# Patient Record
Sex: FEMALE | Race: WHITE | URBAN - METROPOLITAN AREA
[De-identification: names, ages, dates, MRNs, and addresses within clinical notes are randomized per-mention and may not be internally consistent; named-entity substitution may affect disease eponyms.]

---

## 2023-05-11 LAB
EST. AVERAGE GLUCOSE BLD GHB EST-MCNC: 120 MG/DL
HBA1C MFR BLD: 5.8 %

## 2023-08-11 LAB
CHOLESTEROL/HDL RATIO: 3.2
CHOLESTEROL: 181 MG/DL
HDLC SERPL-MCNC: 56 MG/DL
HOMOCYSTINE BLOOD: 9.81 UMOL/L
LDL CHOLESTEROL CALCULATED: 108 MG/DL
MAGNESIUM: 2 MG/DL
TRIGL SERPL-MCNC: 87 MG/DL
VITAMIN B-12: 988 PG/ML
VITAMIN D 25-HYDROXY: 148.9 NG/ML
VLDLC SERPL CALC-MCNC: 17 MG/DL

## 2025-02-20 ENCOUNTER — HOSPITAL ENCOUNTER (OUTPATIENT)
Age: 65
Setting detail: SPECIMEN
Discharge: HOME | End: 2025-02-20

## 2025-02-20 LAB
ALP SERPL-CCNC: 135 U/L (ref 35–104)
EST. AVERAGE GLUCOSE BLD GHB EST-MCNC: 94 MG/DL
HBA1C MFR BLD: 4.9 % (ref 4–6)

## 2025-02-22 LAB
ALK PHOS BONE SPECIFIC: 81 U/L (ref 0–55)
ALK PHOS OTHER CALC: 0 U/L
ALK PHOSPHATASE: 142 U/L (ref 40–120)
ALKALINE PHOSPHATASE LIVER FRACTION: 61 U/L (ref 0–94)

## 2025-05-01 ENCOUNTER — HOSPITAL ENCOUNTER (OUTPATIENT)
Age: 65
Setting detail: SPECIMEN
Discharge: HOME | End: 2025-05-01

## 2025-05-01 LAB
CREAT SERPL-MCNC: 0.6 MG/DL (ref 0.6–0.9)
GFR, ESTIMATED: >90 ML/MIN/1.73M2

## 2025-05-27 ENCOUNTER — CLINICAL DOCUMENTATION (OUTPATIENT)
Dept: SURGERY | Age: 65
End: 2025-05-27

## 2025-05-27 NOTE — PROGRESS NOTES
Patient called left message requesting appointment regarding possible need for surgical intervention for hernia, states she has seen MD in clinic 10years ago. This nurse attempted to reach patient - left message.

## 2025-06-02 ENCOUNTER — CLINICAL DOCUMENTATION (OUTPATIENT)
Dept: SURGERY | Age: 65
End: 2025-06-02

## 2025-06-23 ENCOUNTER — HOSPITAL ENCOUNTER (OUTPATIENT)
Dept: SURGERY | Age: 65
Discharge: HOME | End: 2025-06-23
Payer: COMMERCIAL

## 2025-06-23 VITALS
HEART RATE: 62 BPM | WEIGHT: 179.9 LBS | OXYGEN SATURATION: 94 % | DIASTOLIC BLOOD PRESSURE: 52 MMHG | RESPIRATION RATE: 18 BRPM | SYSTOLIC BLOOD PRESSURE: 94 MMHG | BODY MASS INDEX: 28.91 KG/M2 | HEIGHT: 66 IN

## 2025-06-23 PROBLEM — R19.00 ABDOMINAL WALL BULGE: Status: ACTIVE | Noted: 2025-06-23

## 2025-06-23 PROCEDURE — 99202 OFFICE O/P NEW SF 15 MIN: CPT | Performed by: STUDENT IN AN ORGANIZED HEALTH CARE EDUCATION/TRAINING PROGRAM

## 2025-06-23 PROCEDURE — 99203 OFFICE O/P NEW LOW 30 MIN: CPT | Performed by: STUDENT IN AN ORGANIZED HEALTH CARE EDUCATION/TRAINING PROGRAM

## 2025-06-23 NOTE — H&P
[facility]  Consult Note            NAME:  Yusef Ramirez  MRN: [id number]   YOB: 1960   Date: 6/23/2025   Age: 64 y.o.  Gender: female     Body mass index is 29.04 kg/m².     Chief Complaint: Abdominal lump    History of Present Illness: Patient is a 64-year-old female with a history of hypothyroidism and prediabetes who presents with right abdominal lump.  Patient states that over the last 18 months she has lost over 100 pounds with diet and exercise.  Since she has lost so much weight, she noticed that there is a slight bulge to the right abdominal wall.  She denies any pain at the area.  She thought that maybe since she was doing so many ab workouts that this could have caused something.  The lump has not gotten any larger.  She otherwise denies any sort of abdominal pain or discomfort.  States that she feels and sees it more when she is standing up and it goes away as soon as she lays down.  Patient was evaluated by her PCP who ordered a CT scan of the abdomen which showed a diastases recti but no other abnormalities to the right abdominal wall.  Patient states that she had maybe 2 hernia repairs in the past for which Dr. Marshall had operated on her once; she underwent open hysterectomy for uterine fibroids in 2011 and states that at that time she had a hernia that was fixed.  She then underwent another surgery for which she thinks was another hernia but unsure at this time.  Patient otherwise denies any fevers, chills, shortness of breath, chest pain, nausea or vomiting.    Medications:  Vitamin supplements (vitamin C, vitamin D, chromium, B12, glutamine, niacin), coenzyme q10, acidophilus, Synthroid, magnesium oxide      No Known Allergies    Hypothyroidism  Prediabetes    Hysterectomy  Umbilical hernia repair    Mother: Diabetes, MI, lung cancer, hypertension  Father: Diabetes, hypertension    Social History     Socioeconomic History    Marital status:

## 2025-08-06 ENCOUNTER — HOSPITAL ENCOUNTER (OUTPATIENT)
Age: 65
Setting detail: SPECIMEN
Discharge: HOME | End: 2025-08-06

## 2025-08-06 LAB
ALP SERPL-CCNC: 126 U/L (ref 35–104)
EST. AVERAGE GLUCOSE BLD GHB EST-MCNC: 94 MG/DL
HBA1C MFR BLD: 4.9 % (ref 4–6)

## 2025-08-09 LAB
ALK PHOS BONE SPECIFIC: 65 U/L (ref 0–55)
ALK PHOS OTHER CALC: 0 U/L
ALK PHOSPHATASE: 130 U/L (ref 40–120)
ALKALINE PHOSPHATASE LIVER FRACTION: 65 U/L (ref 0–94)